# Patient Record
Sex: FEMALE | Race: OTHER | URBAN - METROPOLITAN AREA
[De-identification: names, ages, dates, MRNs, and addresses within clinical notes are randomized per-mention and may not be internally consistent; named-entity substitution may affect disease eponyms.]

---

## 2022-11-17 ENCOUNTER — OFFICE VISIT (OUTPATIENT)
Dept: ORTHOPEDIC SURGERY | Age: 37
End: 2022-11-17
Payer: COMMERCIAL

## 2022-11-17 VITALS — BODY MASS INDEX: 20.32 KG/M2 | RESPIRATION RATE: 16 BRPM | HEIGHT: 72 IN | WEIGHT: 150 LBS

## 2022-11-17 DIAGNOSIS — M67.441 GANGLION CYST OF FLEXOR TENDON SHEATH OF FINGER, RIGHT: Primary | ICD-10-CM

## 2022-11-17 PROCEDURE — 99203 OFFICE O/P NEW LOW 30 MIN: CPT | Performed by: ORTHOPAEDIC SURGERY

## 2022-11-18 PROBLEM — M67.441 GANGLION CYST OF FLEXOR TENDON SHEATH OF FINGER, RIGHT: Status: ACTIVE | Noted: 2022-11-18

## 2022-11-18 NOTE — PROGRESS NOTES
This 39y.o. year old right hand dominant / is seen today with a chief complaint of a mass on the right index finger. It has been present for approximately 10 days. It's size is possibly increasing. It is not associated with pain, but is mildlt tender to pressure. Feelings of stiffness and fullness are often perceived by the patient. There is not  a history of injury or overuse. There is no history of weakness, fever or chills. The patient has become concerned about the mass and the worsening associated symptoms and is seen for hand surgery evaluation. The pain assessment has been reviewed and is correct as stated. .    The patient's social history, past medical history, family history, medications, allergies and review of systems, entered 11/17/22, have been reviewed, and dated and are recorded in the chart. On physical examination the patient is Height: 6' 1\" (185.4 cm) tall and weighs Weight: 150 lb (68 kg). Respirations are 18 per minute. The patient is well nourished, is oriented to time and place, demonstrates appropriate mood and affect as well as normal gait and station. There is a 1cm mass present on the palmar aspect of the right index finger at the level of the proximal phalanx. It is cystic and nontender to touch. It moves freely over the flexor tendon sheath but not with finger motion. It does transilluminate. The overlying skin is normal.  Range of motion of the fingers, thumbs and wrists is full, bilaterally with no pain or triggering. Distal circulation and sensation are intact in both upper extremities. Gross muscle strength is normal bilaterally. Deep tendon reflexes are present and symmetrical.  Hand and wrist joints are stable. There are no enlarged epitrochlear lymph nodes.     I have personally reviewed and interpreted all previous external imaging studies, laboratory tests, diagnostic procedures and medical encounters pertinent to this patient's visit today.    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 The benign nature of ganglion cysts is discussed including the tendency of the cyst to resolve spontaneously in many cases. The medical indications for treatment are discussed, including all treatment options, both conservative and surgical, with the recurrence rates of each. All questions are answered. The patient will continue to observe the cyst and is instructed to call me or return if it enlarges, remains large and painful or persistently limits function, at which time the cyst would either be aspirated or excised. Today I have spent 32 minutes with this patient including most or all of the following: reviewing the patient's record, independently interpreting tests and Xrays, obtaining a medical history, preforming a physical examination, making a diagnosis, counseling the patient/family/caregiver, ordering medications, tests or procedures, documenting clinical information in the electronic medical record and communicating the results of the encounter to the patient, family, caregiver, primary care and referring physician/practitioner.